# Patient Record
Sex: FEMALE | Race: WHITE | NOT HISPANIC OR LATINO | Employment: UNEMPLOYED | URBAN - METROPOLITAN AREA
[De-identification: names, ages, dates, MRNs, and addresses within clinical notes are randomized per-mention and may not be internally consistent; named-entity substitution may affect disease eponyms.]

---

## 2023-01-24 ENCOUNTER — TRANSCRIBE ORDERS (OUTPATIENT)
Dept: ADMINISTRATIVE | Facility: HOSPITAL | Age: 48
End: 2023-01-24
Payer: COMMERCIAL

## 2023-01-24 DIAGNOSIS — Z12.31 SCREENING MAMMOGRAM FOR BREAST CANCER: Primary | ICD-10-CM

## 2023-02-21 ENCOUNTER — APPOINTMENT (OUTPATIENT)
Dept: OTHER | Facility: HOSPITAL | Age: 48
End: 2023-02-21
Payer: COMMERCIAL

## 2023-02-21 ENCOUNTER — HOSPITAL ENCOUNTER (OUTPATIENT)
Dept: MAMMOGRAPHY | Facility: HOSPITAL | Age: 48
Discharge: HOME | End: 2023-02-21
Payer: COMMERCIAL

## 2023-02-21 DIAGNOSIS — Z92.89 HISTORY OF MAMMOGRAM: ICD-10-CM

## 2023-02-21 DIAGNOSIS — Z12.31 SCREENING MAMMOGRAM FOR BREAST CANCER: ICD-10-CM

## 2023-02-21 PROCEDURE — 77063 BREAST TOMOSYNTHESIS BI: CPT | Performed by: RADIOLOGY

## 2023-02-21 PROCEDURE — 77067 SCR MAMMO BI INCL CAD: CPT | Performed by: RADIOLOGY

## 2023-02-21 PROCEDURE — 77067 SCR MAMMO BI INCL CAD: CPT

## 2023-02-21 PROCEDURE — 77063 BREAST TOMOSYNTHESIS BI: CPT

## 2023-04-24 ENCOUNTER — HOSPITAL ENCOUNTER (OUTPATIENT)
Dept: ULTRASOUND IMAGING | Facility: HOSPITAL | Age: 48
Discharge: HOME | End: 2023-04-24
Payer: COMMERCIAL

## 2023-04-24 ENCOUNTER — HOSPITAL ENCOUNTER (OUTPATIENT)
Dept: MAMMOGRAPHY | Facility: HOSPITAL | Age: 48
Discharge: HOME | End: 2023-04-24
Payer: COMMERCIAL

## 2023-04-24 DIAGNOSIS — R92.8 ABNORMAL MAMMOGRAM: ICD-10-CM

## 2023-04-24 PROCEDURE — 76642 ULTRASOUND BREAST LIMITED: CPT

## 2023-04-24 PROCEDURE — 77066 DX MAMMO INCL CAD BI: CPT

## 2023-04-24 PROCEDURE — 77062 BREAST TOMOSYNTHESIS BI: CPT | Performed by: RADIOLOGY

## 2023-04-24 PROCEDURE — 76642 ULTRASOUND BREAST LIMITED: CPT | Performed by: RADIOLOGY

## 2023-04-24 PROCEDURE — G0279 TOMOSYNTHESIS, MAMMO: HCPCS

## 2023-04-24 PROCEDURE — 77066 DX MAMMO INCL CAD BI: CPT | Performed by: RADIOLOGY

## 2023-05-22 ENCOUNTER — HOSPITAL ENCOUNTER (OUTPATIENT)
Dept: MAMMOGRAPHY | Facility: HOSPITAL | Age: 48
Discharge: HOME | End: 2023-05-22
Payer: COMMERCIAL

## 2023-05-22 DIAGNOSIS — R92.8 ABNORMAL MAMMOGRAM: ICD-10-CM

## 2023-05-22 PROCEDURE — A4648 IMPLANTABLE TISSUE MARKER: HCPCS

## 2023-05-22 PROCEDURE — 88305 TISSUE EXAM BY PATHOLOGIST: CPT | Performed by: RADIOLOGY

## 2023-05-22 PROCEDURE — 0 LIDOCAINE 1 % SOLUTION: Performed by: RADIOLOGY

## 2023-05-22 PROCEDURE — 76098 X-RAY EXAM SURGICAL SPECIMEN: CPT

## 2023-05-22 RX ORDER — LIDOCAINE HYDROCHLORIDE 10 MG/ML
5 INJECTION, SOLUTION INFILTRATION; PERINEURAL ONCE
Status: COMPLETED | OUTPATIENT
Start: 2023-05-22 | End: 2023-05-22

## 2023-05-22 RX ORDER — LIDOCAINE HYDROCHLORIDE AND EPINEPHRINE 10; 10 MG/ML; UG/ML
10 INJECTION, SOLUTION INFILTRATION; PERINEURAL ONCE
Status: COMPLETED | OUTPATIENT
Start: 2023-05-22 | End: 2023-05-22

## 2023-05-22 RX ADMIN — LIDOCAINE HYDROCHLORIDE,EPINEPHRINE BITARTRATE 10 ML: 10; .01 INJECTION, SOLUTION INFILTRATION; PERINEURAL at 14:48

## 2023-05-22 RX ADMIN — Medication 5 ML: at 14:39

## 2023-05-22 RX ADMIN — LIDOCAINE HYDROCHLORIDE,EPINEPHRINE BITARTRATE 10 ML: 10; .01 INJECTION, SOLUTION INFILTRATION; PERINEURAL at 14:39

## 2023-05-22 NOTE — PROGRESS NOTES
Alert and orientated. Denies discomfort, no active bleeding, steri-strips not visualized, gauze dressing intact.  Cold packs given. Verbalizes and demonstrates understanding of post-care instructions, written copy given.

## 2023-05-25 ENCOUNTER — HOSPITAL ENCOUNTER (OUTPATIENT)
Dept: MAMMOGRAPHY | Facility: HOSPITAL | Age: 48
Discharge: HOME | End: 2023-05-25
Payer: COMMERCIAL

## 2023-05-25 ENCOUNTER — TELEPHONE (OUTPATIENT)
Dept: MAMMOGRAPHY | Facility: HOSPITAL | Age: 48
End: 2023-05-25
Payer: COMMERCIAL

## 2023-05-25 DIAGNOSIS — R92.8 ABNORMAL RADIOGRAPHIC FINDINGS IN SPECIMEN FROM FEMALE BREAST: ICD-10-CM

## 2023-05-25 LAB
CYTO UR: NORMAL
LAB AP CASE REPORT: NORMAL
LAB AP CLINICAL INFORMATION: NORMAL
LAB AP DIAGNOSIS COMMENT: NORMAL
PATH REPORT.FINAL DX SPEC: NORMAL
PATH REPORT.GROSS SPEC: NORMAL

## 2023-05-25 PROCEDURE — 76098 X-RAY EXAM SURGICAL SPECIMEN: CPT

## 2023-05-25 NOTE — TELEPHONE ENCOUNTER
Attempted to notify patient of pathology results and recommendation.  A message was left on her voicemail to return my call.

## 2023-05-26 ENCOUNTER — TELEPHONE (OUTPATIENT)
Dept: MAMMOGRAPHY | Facility: HOSPITAL | Age: 48
End: 2023-05-26
Payer: COMMERCIAL

## 2023-12-04 ENCOUNTER — TRANSCRIBE ORDERS (OUTPATIENT)
Dept: ADMINISTRATIVE | Facility: HOSPITAL | Age: 48
End: 2023-12-04
Payer: COMMERCIAL

## 2023-12-04 DIAGNOSIS — R92.8 ABNORMAL MAMMOGRAM: Primary | ICD-10-CM

## 2024-02-13 ENCOUNTER — TRANSCRIBE ORDERS (OUTPATIENT)
Dept: ADMINISTRATIVE | Facility: HOSPITAL | Age: 49
End: 2024-02-13
Payer: COMMERCIAL

## 2024-02-13 DIAGNOSIS — K21.9 GASTROESOPHAGEAL REFLUX DISEASE WITHOUT ESOPHAGITIS: Primary | ICD-10-CM

## 2024-04-18 ENCOUNTER — HOSPITAL ENCOUNTER (OUTPATIENT)
Dept: NUCLEAR MEDICINE | Facility: HOSPITAL | Age: 49
Discharge: HOME | End: 2024-04-18
Payer: COMMERCIAL

## 2024-04-18 DIAGNOSIS — K21.9 GASTROESOPHAGEAL REFLUX DISEASE WITHOUT ESOPHAGITIS: ICD-10-CM

## 2024-04-18 PROCEDURE — 78264 GASTRIC EMPTYING IMG STUDY: CPT

## 2024-04-18 PROCEDURE — A9541 TC99M SULFUR COLLOID: HCPCS | Performed by: INTERNAL MEDICINE

## 2024-04-18 PROCEDURE — 0 TECHNETIUM SULFUR COLLOID: Performed by: INTERNAL MEDICINE

## 2024-04-18 RX ADMIN — TECHNETIUM TC 99M SULFUR COLLOID 1 DOSE: KIT at 09:19

## 2024-04-25 ENCOUNTER — HOSPITAL ENCOUNTER (OUTPATIENT)
Dept: MAMMOGRAPHY | Facility: HOSPITAL | Age: 49
Discharge: HOME | End: 2024-04-25
Admitting: INTERNAL MEDICINE
Payer: COMMERCIAL

## 2024-04-25 DIAGNOSIS — R92.8 ABNORMAL MAMMOGRAM: ICD-10-CM

## 2024-04-25 PROCEDURE — 77066 DX MAMMO INCL CAD BI: CPT

## 2024-04-25 PROCEDURE — G0279 TOMOSYNTHESIS, MAMMO: HCPCS

## 2024-05-01 ENCOUNTER — OFFICE VISIT (OUTPATIENT)
Dept: GASTROENTEROLOGY | Facility: CLINIC | Age: 49
End: 2024-05-01
Payer: COMMERCIAL

## 2024-05-01 VITALS
OXYGEN SATURATION: 99 % | DIASTOLIC BLOOD PRESSURE: 82 MMHG | BODY MASS INDEX: 26.13 KG/M2 | HEART RATE: 71 BPM | SYSTOLIC BLOOD PRESSURE: 118 MMHG | WEIGHT: 138.4 LBS | HEIGHT: 61 IN

## 2024-05-01 DIAGNOSIS — R10.13 DYSPEPSIA: Primary | ICD-10-CM

## 2024-05-01 DIAGNOSIS — K29.70 GASTRITIS WITHOUT BLEEDING, UNSPECIFIED CHRONICITY, UNSPECIFIED GASTRITIS TYPE: ICD-10-CM

## 2024-05-01 DIAGNOSIS — K52.9 ILEITIS: ICD-10-CM

## 2024-05-01 PROCEDURE — 99204 OFFICE O/P NEW MOD 45 MIN: CPT | Performed by: INTERNAL MEDICINE

## 2024-05-01 RX ORDER — PANTOPRAZOLE SODIUM 40 MG/1
40 TABLET, DELAYED RELEASE ORAL 2 TIMES DAILY
COMMUNITY
Start: 2024-02-15

## 2024-05-01 RX ORDER — MULTIVIT-MIN/IRON/FOLIC ACID/K 18-600-40
CAPSULE ORAL
COMMUNITY
Start: 2024-02-15

## 2024-05-01 NOTE — PROGRESS NOTES
"GASTROENTEROLOGY OFFICE NOTE  Cydney Pimentel  [id number]  1975      Chief Complaint   Patient presents with    Gastroesophageal reflux disease without esophagitis     cough        HISTORY OF PRESENT ILLNESS:  First visit to me for this 48-year-old female    Taking care of her parents and other family members.  She has had a recent evaluation by Dr. Felipe Tomlinson.  She underwent a screening colonoscopy which was within normal limits except for some nonspecific ileitis which is not felt to be necessarily diagnostic for Crohn's disease.  She has no particular problems with right lower quadrant abdominal pain or diarrhea or cramping.  Budesonide was recommended.  Serpiginous ulcerations were noted with some edema and nonobstructive stenosis in the terminal ileum.  A Tame IBD panel returned \"not consistent with IBD \".  She has also had an upper endoscopy which revealed nonspecific gastritis.  She is taking pantoprazole 40 mg p.o. twice daily and reflux symptoms seem to improve.    She has had GERD for years.  It has been increasing over the past year.  She has a nighttime cough.  She had a gastric emptying scan which was reportedly within normal limits.    She states she can regurgitate food sometimes ingested the night before.  She denies dysphagia to solids or odynophagia..  She has no true early satiety but symptoms seem to be wore with large-volume meals or fatty meals.    PAST MEDICAL HISTORY  Past Medical History:    Anemia    GERD (gastroesophageal reflux disease)        PAST SURGICAL HISTORY  Past Surgical History:    COLONOSCOPY    UPPER GASTROINTESTINAL ENDOSCOPY        MEDICATIONS:    Current Outpatient Medications:     cetirizine (zyrTEC) 10 MG tablet, Take 1 tablet by mouth Daily., Disp: , Rfl:     Cholecalciferol (Vitamin D) 50 MCG (2000 UT) capsule, , Disp: , Rfl:     pantoprazole (PROTONIX) 40 MG EC tablet, Take 1 tablet by mouth 2 (Two) Times a Day., Disp: , Rfl:     ALLERGIES  has No Known " "Allergies.    FAMILY HISTORY:  Cancer-related family history includes Ovarian cancer in her maternal aunt. There is no history of Breast cancer.  Colon Cancer-related family history is not on file.    SOCIAL HISTORY  She  reports that she has never smoked. She has never used smokeless tobacco. She reports that she does not drink alcohol and does not use drugs.   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  Her daughters are 12 and 14 years old.  She is a non-smoker.  Nondrinker.    PHYSICAL EXAM   /82 (BP Location: Left arm, Patient Position: Sitting, Cuff Size: Adult)   Pulse 71   Ht 154.9 cm (61\")   Wt 62.8 kg (138 lb 6.4 oz)   LMP 04/25/2024   SpO2 99%   BMI 26.15 kg/m²   General: Alert and oriented x 3. In no apparent or acute distress.  and No stigmata of chronic liver disease  HEENT: Anicteric sclerae. Normal oropharynx  Neck: Supple. Without lymphadenopathy  CV: Regular rate and rhythm, S1, S2  Lungs: Clear to ausculation. Without rales, rhonchi and wheezing  Abdomen:  Soft,non-distended without palpable masses or hepatosplenomeagaly, areas of rebound tenderness or guarding.   Extremeties: without clubbing, cyanosis or edema  Neurologic:  Alert and oriented x 3 without focal motor or sensory deficits  Rectal exam: deferred       ASSESSMENT  1.-Chronic dyspeptic symptoms highly suggestive of gastroparesis.  She is a reliable historian and the fact that she can regurgitate food sometimes ingested clearly well over 4 to 6 hours before is consistent with gastroparesis.  Gastric emptying scan is are notoriously unreliable  That her history is consistent with gastroparesis.  She is agreeable to a trial of metoclopramide.  We talked about nasal metoclopramide which may be a little bit more difficult to get her insurance covered but she is fine with taking just oral metoclopramide initially and adhering to dietary modification for gastroparesis  2.-History of nonspecific " gastritis.  No evidence of H. pylori  3.-History of ileitis without symptoms or signs suggestive of Crohn's disease.  She may indeed have mild Crohn's ileitis and this may warrant repeat evaluation pending her clinical progress.  Of note, biopsies were consistent although not clearly diagnostic for Crohn's disease.    PLAN  1.-Initiate metoclopramide 10 mg p.o. 3 times daily half an hour before meals and titrate downward to the lowest dose necessary for improvement of symptoms.  If responsive but requiring ongoing therapy consider domperidone to minimize risk of permanent tardive dyskinesia which was reviewed (currently stated to be approximately 1 for every 1000 patient years)  2.-Consider repeat colonoscopy for reassessment of possible Crohn's ileitis pending clinical progress  3.-Dietary modification gastroparesis recommended  3.-Follow-up appointment in 3 months.      Jacky Navarro MD  5/1/2024   14:33 EDT

## 2024-05-05 PROBLEM — R10.13 DYSPEPSIA: Status: ACTIVE | Noted: 2024-05-05

## 2024-05-05 PROBLEM — K29.70 GASTRITIS WITHOUT BLEEDING: Status: ACTIVE | Noted: 2024-05-05

## 2024-05-05 PROBLEM — K52.9 ILEITIS: Status: ACTIVE | Noted: 2024-05-05

## 2024-05-08 ENCOUNTER — TELEPHONE (OUTPATIENT)
Dept: GASTROENTEROLOGY | Facility: CLINIC | Age: 49
End: 2024-05-08
Payer: COMMERCIAL

## 2024-05-08 RX ORDER — METOCLOPRAMIDE 10 MG/1
10 TABLET ORAL
Qty: 90 TABLET | Refills: 3 | Status: CANCELLED | OUTPATIENT
Start: 2024-05-08

## 2024-05-08 RX ORDER — METOCLOPRAMIDE 10 MG/1
10 TABLET ORAL
Qty: 90 TABLET | Refills: 3 | Status: SHIPPED | OUTPATIENT
Start: 2024-05-08

## 2024-08-06 ENCOUNTER — OFFICE VISIT (OUTPATIENT)
Dept: GASTROENTEROLOGY | Facility: CLINIC | Age: 49
End: 2024-08-06
Payer: COMMERCIAL

## 2024-08-06 VITALS
DIASTOLIC BLOOD PRESSURE: 88 MMHG | SYSTOLIC BLOOD PRESSURE: 131 MMHG | HEART RATE: 71 BPM | BODY MASS INDEX: 26.06 KG/M2 | HEIGHT: 61 IN | WEIGHT: 138 LBS

## 2024-08-06 DIAGNOSIS — K52.9 ILEITIS: ICD-10-CM

## 2024-08-06 DIAGNOSIS — R10.13 DYSPEPSIA: Primary | ICD-10-CM

## 2024-08-06 DIAGNOSIS — K21.9 GASTROESOPHAGEAL REFLUX DISEASE WITHOUT ESOPHAGITIS: ICD-10-CM

## 2024-08-06 DIAGNOSIS — K31.84 GASTROPARESIS: ICD-10-CM

## 2024-08-06 PROCEDURE — 99214 OFFICE O/P EST MOD 30 MIN: CPT | Performed by: INTERNAL MEDICINE

## 2024-08-06 RX ORDER — PANTOPRAZOLE SODIUM 40 MG/1
40 TABLET, DELAYED RELEASE ORAL 2 TIMES DAILY
Qty: 180 TABLET | Refills: 3 | Status: SHIPPED | OUTPATIENT
Start: 2024-08-06

## 2024-08-06 NOTE — PROGRESS NOTES
GASTROENTEROLOGY OFFICE NOTE  Cydney Pimentel  [id number]  1975      Chief Complaint   Patient presents with    Dyspepsia    Gastroparesis        HISTORY OF PRESENT ILLNESS:  Patient presents for follow-up.  I saw her on May 1, 2024 when she presented with gastroesophageal reflux disease and cough.  She has had a previous workup by Dr. Felipe Tomlinson, GI,.  The colonoscopy was within normal limits except for nonspecific ileitis which was not felt to be diagnostic for Crohn's disease.  Budesonide was recommended.  Serpiginous ulcerations were noted and a nonobstructive stenotic area was also identified.  Upper endoscopy revealed gastritis.  Pantoprazole 40 mg twice daily have resolved reflux type symptoms.    Her history was suggestive of gastroparesis.  She gave a history of sometimes regurgitating food ingested the night before.  Again, she is without dysphagia to solids, odynophagia, early satiety or unexplained weight loss.    It was felt that she had gastroparesis and metoclopramide as recommended but she did not initiated.  She instituted dietary modification gastroparesis and states that she is doing better.  When she does get some exacerbation of her dyspeptic symptoms walking or drinking warm water, a very small amount, seems to help her.    PAST MEDICAL HISTORY  Past Medical History:    Anemia    GERD (gastroesophageal reflux disease)        PAST SURGICAL HISTORY  Past Surgical History:    COLONOSCOPY    UPPER GASTROINTESTINAL ENDOSCOPY        MEDICATIONS:    Current Outpatient Medications:     cetirizine (zyrTEC) 10 MG tablet, Take 1 tablet by mouth Daily., Disp: , Rfl:     Cholecalciferol (Vitamin D) 50 MCG (2000 UT) capsule, , Disp: , Rfl:     metoclopramide (REGLAN) 10 MG tablet, Take 1 tablet by mouth 3 (Three) Times a Day Before Meals., Disp: 90 tablet, Rfl: 3    pantoprazole (PROTONIX) 40 MG EC tablet, Take 1 tablet by mouth 2 (Two) Times a Day., Disp: 180 tablet, Rfl: 3    ALLERGIES  has No Known  "Allergies.    FAMILY HISTORY:  Cancer-related family history includes Ovarian cancer in her maternal aunt. There is no history of Breast cancer.  Colon Cancer-related family history is not on file.    SOCIAL HISTORY  She  reports that she has never smoked. She has never used smokeless tobacco. She reports that she does not drink alcohol and does not use drugs.   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  She has daughters ages 12 and 14.  She is a non-smoker.  Nondrinker.      PHYSICAL EXAM   /88 (BP Location: Right arm, Patient Position: Sitting, Cuff Size: Large Adult)   Pulse 71   Ht 154.9 cm (61\")   Wt 62.6 kg (138 lb)   BMI 26.07 kg/m²   General: Alert and oriented x 3. In no apparent or acute distress.  and No stigmata of chronic liver disease  HEENT: Anicteric sclerae. Normal oropharynx  Neck: Supple. Without lymphadenopathy  CV: Regular rate and rhythm, S1, S2  Lungs: Clear to ausculation. Without rales, rhonchi and wheezing  Abdomen:  Soft,non-distended without palpable masses or hepatosplenomeagaly, areas of rebound tenderness or guarding.   Extremeties: without clubbing, cyanosis or edema  Neurologic:  Alert and oriented x 3 without focal motor or sensory deficits  Rectal exam: deferred       ASSESSMENT  1.-Gastroparesis improved with dietary modifications.  Occasional exacerbation of symptoms.  She is provided with a sample of intranasal metoclopramide to use as rescue therapy on those occasions where her symptoms exacerbate  2.-Gastroesophageal reflux disease.  Continue pantoprazole 40 mg p.o. twice daily  3.-Stenotic ileum with serpiginous ulceration suggestive of Crohn's disease..  Asymptomatic without right lower quadrant abdominal pain, diarrhea.  Is being managed conservatively at this time but initiation of therapy and repeated colonoscopy are considerations    PLAN  1.-Continue dietary modification gastroparesis  2.-Consider repeat " colonoscopy  3.-Intranasal metoclopramide to be used as needed  4.-Return appointment      Jacky Navarro MD  8/9/2024   06:34 EDT

## 2024-08-09 PROBLEM — K21.9 GASTROESOPHAGEAL REFLUX DISEASE WITHOUT ESOPHAGITIS: Status: ACTIVE | Noted: 2024-08-09

## 2024-08-09 PROBLEM — K31.84 GASTROPARESIS: Status: ACTIVE | Noted: 2024-08-09

## 2025-01-17 ENCOUNTER — TRANSCRIBE ORDERS (OUTPATIENT)
Dept: ADMINISTRATIVE | Facility: HOSPITAL | Age: 50
End: 2025-01-17
Payer: COMMERCIAL

## 2025-01-17 DIAGNOSIS — T17.998A ASPIRATION OF LIQUID, INITIAL ENCOUNTER: Primary | ICD-10-CM

## 2025-01-24 ENCOUNTER — TRANSCRIBE ORDERS (OUTPATIENT)
Dept: ADMINISTRATIVE | Facility: HOSPITAL | Age: 50
End: 2025-01-24
Payer: COMMERCIAL

## 2025-01-24 ENCOUNTER — HOSPITAL ENCOUNTER (OUTPATIENT)
Dept: GENERAL RADIOLOGY | Facility: HOSPITAL | Age: 50
Discharge: HOME | End: 2025-01-24
Payer: COMMERCIAL

## 2025-01-24 DIAGNOSIS — T17.998A ASPIRATION OF LIQUID, INITIAL ENCOUNTER: Primary | ICD-10-CM

## 2025-01-24 DIAGNOSIS — T17.998A ASPIRATION OF LIQUID, INITIAL ENCOUNTER: ICD-10-CM

## 2025-01-24 PROCEDURE — 92611 MOTION FLUOROSCOPY/SWALLOW: CPT

## 2025-01-24 PROCEDURE — 74230 X-RAY XM SWLNG FUNCJ C+: CPT

## 2025-01-24 RX ADMIN — BARIUM SULFATE 100 ML: 0.81 POWDER, FOR SUSPENSION ORAL at 09:00

## 2025-01-24 RX ADMIN — BARIUM SULFATE 20 ML: 400 PASTE ORAL at 09:00

## 2025-01-24 NOTE — MBS/VFSS/FEES
Outpatient Speech Language Pathology   Adult Swallow Initial Evaluation  Modified Barium Swallow Study (MBS)   [facility]     Patient Name: Cydney Pimentel  : 1975  MRN: [id number]  Today's Date: 2025         Visit Date: 2025   Patient Active Problem List   Diagnosis    Gastritis without bleeding    Dyspepsia    Ileitis    Gastroparesis    Gastroesophageal reflux disease without esophagitis        Past Medical History:   Diagnosis Date    Anemia     GERD (gastroesophageal reflux disease)         Past Surgical History:   Procedure Laterality Date    COLONOSCOPY      UPPER GASTROINTESTINAL ENDOSCOPY           Visit Dx:     ICD-10-CM ICD-9-CM   1. Aspiration of liquid, initial encounter  T17.998A 934.9                SLP Adult Swallow Evaluation       Row Name 25 0830       General Information    Pertinent History Of Current Problem Chronic cough. GERD, gastroparesis, takes PPI BID. Feels aspiration intermittently through the day, including on saliva when not eating/drinking.  -SM    Current Method of Nutrition regular textures;thin liquids  -    Plans/Goals Discussed with patient;agreed upon  -    Barriers to Rehab none identified  -    Patient's Goals for Discharge eat/drink without coughing/choking  -SM       Pain    Pretreatment Pain Rating 0/10 - no pain  -SM    Posttreatment Pain Rating 0/10 - no pain  -SM       MBS/VFSS    Utensils Used spoon;cup;straw  -SM    Consistencies Trialed thin liquids;pureed;regular textures  -SM       MBS/VFSS Interpretation    Oral Phase WFL  -SM    Pharyngeal Phase functional pharyngeal phase of swallowing  -SM    Esophageal Phase other (see comments)  -SM    Esophageal Phase, Comment All barium media observed to empty into stomach without issue. Though esophageal scan in upright position is not sensitive for capturing ARMANDO. Based on symptoms and pattern of occurrence, suspect cough to be GERD-related (vs other potential irritant).  -       SLP  Evaluation Clinical Impression    SLP Swallowing Diagnosis functional oral phase;functional pharyngeal phase  -    Swallow Criteria for Skilled Therapeutic Interventions Met no problems identified which require skilled intervention  -       Recommendations    Therapy Frequency (Swallow) evaluation only  -    SLP Diet Recommendation regular textures;thin liquids  -    Recommended Diagnostics No further SLP services recommended  -    Recommended Precautions and Strategies reflux precautions  -    SLP Rec. for Method of Medication Administration as tolerated  Pt reports no difficulties taking pills. Discussed trialing whole in puree if experiences any discomfort or sticking with swallow.  -              User Key  (r) = Recorded By, (t) = Taken By, (c) = Cosigned By      Initials Name Provider Type    Keyona Hodge MS CCC-SLP Speech and Language Pathologist                               Time Calculation:   SLP Start Time: 0830  Untimed Charges  69573-OR Motion Fluoro Eval Swallow Minutes: 60  Total Minutes  Untimed Charges Total Minutes: 60   Total Minutes: 60    Therapy Charges for Today       Code Description Service Date Service Provider Modifiers Qty    16884051027 HC ST MOTION FLUORO EVAL SWALLOW 4 1/24/2025 Keyona Borden MS CCC-SLP GN 1                     Keyona Borden MS CCC-SLP  1/24/2025

## 2025-01-29 ENCOUNTER — TRANSCRIBE ORDERS (OUTPATIENT)
Dept: ADMINISTRATIVE | Facility: HOSPITAL | Age: 50
End: 2025-01-29
Payer: COMMERCIAL

## 2025-01-29 DIAGNOSIS — Z12.31 VISIT FOR SCREENING MAMMOGRAM: Primary | ICD-10-CM

## 2025-02-05 ENCOUNTER — HOSPITAL ENCOUNTER (OUTPATIENT)
Facility: HOSPITAL | Age: 50
Discharge: HOME | End: 2025-02-05
Admitting: INTERNAL MEDICINE
Payer: COMMERCIAL

## 2025-02-05 DIAGNOSIS — Z12.31 VISIT FOR SCREENING MAMMOGRAM: ICD-10-CM

## 2025-02-05 LAB
NCCN CRITERIA FLAG: NORMAL
TYRER CUZICK SCORE: 10.9

## 2025-02-05 PROCEDURE — 77063 BREAST TOMOSYNTHESIS BI: CPT

## 2025-02-05 PROCEDURE — 77067 SCR MAMMO BI INCL CAD: CPT

## 2025-02-11 ENCOUNTER — OFFICE VISIT (OUTPATIENT)
Dept: GASTROENTEROLOGY | Facility: CLINIC | Age: 50
End: 2025-02-11
Payer: COMMERCIAL

## 2025-02-11 VITALS
SYSTOLIC BLOOD PRESSURE: 108 MMHG | WEIGHT: 128 LBS | HEART RATE: 74 BPM | DIASTOLIC BLOOD PRESSURE: 80 MMHG | HEIGHT: 61 IN | BODY MASS INDEX: 24.17 KG/M2 | OXYGEN SATURATION: 99 %

## 2025-02-11 DIAGNOSIS — K21.9 GASTROESOPHAGEAL REFLUX DISEASE WITHOUT ESOPHAGITIS: ICD-10-CM

## 2025-02-11 DIAGNOSIS — R10.13 DYSPEPSIA: Primary | ICD-10-CM

## 2025-02-11 DIAGNOSIS — K31.84 GASTROPARESIS: ICD-10-CM

## 2025-02-11 DIAGNOSIS — R43.2 LOSS OF TASTE: Primary | ICD-10-CM

## 2025-02-11 DIAGNOSIS — R43.2 AGEUSIA: ICD-10-CM

## 2025-02-11 DIAGNOSIS — K52.9 ILEITIS: ICD-10-CM

## 2025-02-11 PROCEDURE — 99214 OFFICE O/P EST MOD 30 MIN: CPT | Performed by: INTERNAL MEDICINE

## 2025-02-11 RX ORDER — FERROUS GLUCONATE 324(38)MG
324 TABLET ORAL 2 TIMES DAILY
COMMUNITY

## 2025-02-15 PROBLEM — R43.2 AGEUSIA: Status: ACTIVE | Noted: 2025-02-15

## 2025-02-15 NOTE — PROGRESS NOTES
GASTROENTEROLOGY OFFICE NOTE  Cydney Pimentel  [id number]  1975      Chief Complaint   Patient presents with    Gastroesophageal Reflux Disease, Gastroparesis        HISTORY OF PRESENT ILLNESS:  History of Present Illness  The patient is a 49-year-old female who presents for a 6-month follow-up for dyspepsia and GERD.    She has been experiencing ageusia since October 2024, with a complete loss of taste, including the inability to detect salt. She reports no issues with her sense of smell. She has not consulted a neurologist but did see her primary care physician in January 2025, who suggested potential nutritional deficiencies. Her B6 and B12 levels were normal, but she has iron deficiency anemia, for which she is receiving treatment. She also reports fissures on her tongue, constant numbness, and increased sensitivity in her gums. She has an upcoming appointment with an ENT specialist.    She has lost approximately 15 pounds over the past year due to decreased food intake, as she no longer enjoys eating. She reports no history of respiratory infections or medication changes, except for long-term use of Protonix. Her daughter had a respiratory tract infection, but COVID-19 tests at home were negative. She maintains a healthy diet and reports no abdominal pain, difficulty swallowing, vomiting, blood in stools, or changes in appetite or weight. She also reports no right lower quadrant abdominal pain, cramps, fevers, or chills. She describes a tingling sensation in her tongue, similar to neuropathy.    She has a history of gastroparesis for which p.r.n. use of GIMOTTI was recommended, but she currently is not using it. She has not been using GIMOTTI due to fear of tardive dyskinesia and has experienced mood changes postpartum. She is taking Protonix 40 mg twice daily, which she reports as effective in managing her reflux symptoms.    She has a history of iron deficiency anemia.    MEDICATIONS  Protonix    PAST  "MEDICAL HISTORY  Past Medical History:    Anemia    GERD (gastroesophageal reflux disease)        PAST SURGICAL HISTORY  Past Surgical History:    COLONOSCOPY    UPPER GASTROINTESTINAL ENDOSCOPY        MEDICATIONS:    Current Outpatient Medications:     cetirizine (zyrTEC) 10 MG tablet, Take 1 tablet by mouth Daily., Disp: , Rfl:     Cholecalciferol (Vitamin D) 50 MCG (2000 UT) capsule, , Disp: , Rfl:     ferrous gluconate (FERGON) 324 MG tablet, Take 1 tablet by mouth 2 (Two) Times a Day., Disp: , Rfl:     pantoprazole (PROTONIX) 40 MG EC tablet, Take 1 tablet by mouth 2 (Two) Times a Day., Disp: 180 tablet, Rfl: 3    amitriptyline (ELAVIL) 25 MG tablet, Take 2 tablets by mouth Every Night. Start with one half tablet and increase by one half tablet nightly as tolerated up to 2 tablets at bedtime, Disp: 60 tablet, Rfl: 11    ALLERGIES  has No Known Allergies.    FAMILY HISTORY:  Cancer-related family history includes Ovarian cancer in her maternal aunt. There is no history of Breast cancer.  Colon Cancer-related family history is not on file.    SOCIAL HISTORY  She  reports that she has never smoked. She has never used smokeless tobacco. She reports that she does not drink alcohol and does not use drugs.   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  She has daughters ages 12 and 14.  She is a non-smoker.  Nondrinker      PHYSICAL EXAM   /80 (BP Location: Right arm, Patient Position: Sitting, Cuff Size: Large Adult)   Pulse 74   Ht 154.9 cm (61\")   Wt 58.1 kg (128 lb)   SpO2 99%   BMI 24.19 kg/m²   General: Pleasant, no apparent acute distress.  Alert and oriented.  HEENT: Anicteric sclera.  Significant fissuring of the tongue was not noted  Lungs: Grossly normal respiration without labored breathing or audible wheezing noted.  Speaking in full sentences  Abdomen: Without gross or obvious distention  Neurologic: Normal cognition and affect.  Alert and " oriented        ASSESSMENT/PLAN  Assessment & Plan  1. Ageusia.  The condition may be indicative of neuropathy. She reports a loss of taste since October 2020, with the inability to taste salt and hypersensitivity to spices. A referral to an ENT specialist, Dr. Liu, will be made for further evaluation. A prescription for Elavil 25 mg has been provided, with instructions to start at half a tablet and gradually increase to 50 mg as tolerated. She is advised to persist with this regimen for a period of 6 to 8 weeks. If she experiences grogginess in the morning, the dose should be adjusted accordingly.  Following up with ENT for further evaluation was reviewed as well as consideration of CT brain for further evaluation.    2. Dyspepsia and GERD.  She is currently taking Protonix 40 mg twice daily, which has been effective in managing her symptoms. She is advised to continue this medication as prescribed.    3. Gastroparesis.  She has a history of gastroparesis and was recommended p.r.n. use of GIMOTTI, which she is currently not using due to fear of side effects. She is encouraged to use GIMOTTI for rescue therapy on days when she feels particularly bloated or full.    4. Iron Deficiency Anemia.  She reports iron deficiency and is currently taking the appropriate supplements. She is advised to continue her current iron supplementation regimen.    5. Suspected Crohn's Disease.  There is a suspicion of mild focal Crohn's disease based on her 2023 colonoscopy results showing serpiginous ulceration and edema with nonobstructive stenosis of the terminal ileum. A colonoscopy will be scheduled to reassess the stenotic ulcerated terminal ileum. If active disease is confirmed, treatment with mesalamine will be initiated.      Jacky Navarro MD  2/15/2025   13:46 EST      Patient or patient representative verbalized consent for the use of Ambient Listening during the visit with  Jacky Navarro MD  for chart documentation. 2/15/2025  13:48 EST

## 2025-03-04 RX ORDER — SODIUM, POTASSIUM,MAG SULFATES 17.5-3.13G
SOLUTION, RECONSTITUTED, ORAL ORAL
Qty: 354 ML | Refills: 0 | Status: SHIPPED | OUTPATIENT
Start: 2025-03-04

## 2025-03-10 ENCOUNTER — OUTSIDE FACILITY SERVICE (OUTPATIENT)
Dept: GASTROENTEROLOGY | Facility: CLINIC | Age: 50
End: 2025-03-10
Payer: COMMERCIAL

## 2025-03-10 DIAGNOSIS — K50.018 CROHN'S DISEASE OF SMALL INTESTINE WITH OTHER COMPLICATION: Primary | ICD-10-CM

## 2025-03-11 ENCOUNTER — TRANSCRIBE ORDERS (OUTPATIENT)
Dept: ADMINISTRATIVE | Facility: HOSPITAL | Age: 50
End: 2025-03-11
Payer: COMMERCIAL

## 2025-03-11 DIAGNOSIS — R43.2 PARAGEUSIA: ICD-10-CM

## 2025-03-11 DIAGNOSIS — R43.2 TASTE SENSE ALTERED: Primary | ICD-10-CM

## 2025-03-14 ENCOUNTER — RESULTS FOLLOW-UP (OUTPATIENT)
Dept: GASTROENTEROLOGY | Facility: CLINIC | Age: 50
End: 2025-03-14
Payer: COMMERCIAL

## 2025-03-14 NOTE — LETTER
March 17, 2025    Cydney Pimentel  [address] [address]          Dear Ms. Pimentel:  This letter is to review the biopsy report of your March 10, 2025 colonoscopy.    As you know we found changes consistent with fiber stenosing Crohn's ileitis.    Biopsies did show inflammatory changes consistent with Crohn's disease.  I should note that these inflammatory edges were nonspecific and are not necessarily diagnostic of Crohn's disease however the gross endoscopic appearance and the finding of the stricture are consistent with this diagnosis.    I think if we do not treat these findings there is a risk for progressive disease and further stricturing and ultimately obstruction of your small bowel.    We have ordered a CT enterography/CAT scan enterography to look at the small bowel in further detail and subsequent to that we will make further recommendations on how to best approach managing this very focal Crohn's disease.    I hope this letter finds you well.  I encourage you to call with any questions or concerns you may have.    Sincerely,  Aries Navarro MD    CC: Dr. Roxana Flores

## 2025-03-17 ENCOUNTER — TRANSCRIBE ORDERS (OUTPATIENT)
Dept: NUTRITION | Facility: HOSPITAL | Age: 50
End: 2025-03-17
Payer: COMMERCIAL

## 2025-03-17 DIAGNOSIS — K50.00 CROHN'S DISEASE OF SMALL INTESTINE WITHOUT COMPLICATION: Primary | ICD-10-CM

## 2025-03-18 ENCOUNTER — TRANSCRIBE ORDERS (OUTPATIENT)
Dept: ADMINISTRATIVE | Facility: HOSPITAL | Age: 50
End: 2025-03-18
Payer: COMMERCIAL

## 2025-03-18 ENCOUNTER — HOSPITAL ENCOUNTER (OUTPATIENT)
Dept: CT IMAGING | Facility: HOSPITAL | Age: 50
Discharge: HOME | End: 2025-03-18
Admitting: INTERNAL MEDICINE
Payer: COMMERCIAL

## 2025-03-18 DIAGNOSIS — R09.89 POOR CIRCULATION OF EXTREMITY: Primary | ICD-10-CM

## 2025-03-18 PROCEDURE — 74177 CT ABD & PELVIS W/CONTRAST: CPT

## 2025-03-18 PROCEDURE — 25510000001 IOPAMIDOL PER 1 ML: Performed by: INTERNAL MEDICINE

## 2025-03-18 RX ORDER — IOPAMIDOL 755 MG/ML
150 INJECTION, SOLUTION INTRAVASCULAR
Status: COMPLETED | OUTPATIENT
Start: 2025-03-18 | End: 2025-03-18

## 2025-03-18 RX ADMIN — IOPAMIDOL 125 ML: 755 INJECTION, SOLUTION INTRAVENOUS at 13:55

## 2025-03-20 ENCOUNTER — HOSPITAL ENCOUNTER (OUTPATIENT)
Dept: MRI IMAGING | Facility: HOSPITAL | Age: 50
Discharge: HOME | End: 2025-03-20
Admitting: OTOLARYNGOLOGY
Payer: COMMERCIAL

## 2025-03-20 DIAGNOSIS — R43.2 PARAGEUSIA: ICD-10-CM

## 2025-03-20 PROCEDURE — 25510000002 GADOBENATE DIMEGLUMINE 529 MG/ML SOLUTION: Performed by: OTOLARYNGOLOGY

## 2025-03-20 PROCEDURE — 70553 MRI BRAIN STEM W/O & W/DYE: CPT

## 2025-03-20 PROCEDURE — A9577 INJ MULTIHANCE: HCPCS | Performed by: OTOLARYNGOLOGY

## 2025-03-20 RX ADMIN — GADOBENATE DIMEGLUMINE 15 ML: 529 INJECTION, SOLUTION INTRAVENOUS at 11:11

## 2025-03-22 NOTE — PROGRESS NOTES
"CAT scan of March 21, 2025 reviewed  Reconfirmation of noted terminal ileal stricture which was felt to be \"nonobstructing \".  5 cm length.  Mild wall thickening was noted in this region which would suggest some component of active inflammation..  Superficial aphthous ulceration noted also the terminal ileum    Suspect patient is at risk for obstruction long-term if aggressive management not pursued i.e. initiation of biological therapy.    Will discussed with patient therapeutic options.    Awilda: Please reach out to patient and let her know that the CT enterography did not show any changes other than what we already noted on her colonoscopy which is a stricture at the very end of the small bowel.  I think we should treat this.  If she would like, I think setting up an appointment to go over our options would be best.  "

## 2025-03-24 ENCOUNTER — TELEPHONE (OUTPATIENT)
Dept: GASTROENTEROLOGY | Facility: CLINIC | Age: 50
End: 2025-03-24
Payer: COMMERCIAL

## 2025-03-24 NOTE — TELEPHONE ENCOUNTER
Name: Cydney Pimentel    Relationship: Self    Best Callback Number: [phone]    Patient would like to Schedule a Follow-Up. Unable to schedule within the 3 week timeframe.     Patient is having symptoms of bloating. Please call patient. If not able to reach pt. It is okay to lvm.

## 2025-04-01 ENCOUNTER — OFFICE VISIT (OUTPATIENT)
Dept: GASTROENTEROLOGY | Facility: CLINIC | Age: 50
End: 2025-04-01
Payer: COMMERCIAL

## 2025-04-01 VITALS — OXYGEN SATURATION: 95 % | HEART RATE: 75 BPM | HEIGHT: 61 IN | BODY MASS INDEX: 23.37 KG/M2 | WEIGHT: 123.8 LBS

## 2025-04-01 DIAGNOSIS — K31.84 GASTROPARESIS: ICD-10-CM

## 2025-04-01 DIAGNOSIS — R10.13 DYSPEPSIA: ICD-10-CM

## 2025-04-01 DIAGNOSIS — K50.018 CROHN'S DISEASE OF SMALL INTESTINE WITH OTHER COMPLICATION: Primary | ICD-10-CM

## 2025-04-01 DIAGNOSIS — R43.2 LOSS OF TASTE: ICD-10-CM

## 2025-04-01 DIAGNOSIS — R43.2 AGEUSIA: ICD-10-CM

## 2025-04-01 PROCEDURE — 99214 OFFICE O/P EST MOD 30 MIN: CPT | Performed by: INTERNAL MEDICINE

## 2025-04-01 RX ORDER — GUSELKUMAB 200 MG/20ML
INJECTION INTRAVENOUS
Qty: 600 ML | Refills: 0 | Status: SHIPPED | OUTPATIENT
Start: 2025-04-01

## 2025-04-01 RX ORDER — GUSELKUMAB 200 MG/2ML
200 INJECTION SUBCUTANEOUS TAKE AS DIRECTED
Qty: 20 ML | Refills: 10 | Status: SHIPPED | OUTPATIENT
Start: 2025-04-01

## 2025-04-02 ENCOUNTER — TELEPHONE (OUTPATIENT)
Dept: GASTROENTEROLOGY | Facility: CLINIC | Age: 50
End: 2025-04-02
Payer: COMMERCIAL

## 2025-04-02 ENCOUNTER — HOSPITAL ENCOUNTER (OUTPATIENT)
Dept: NUTRITION | Facility: HOSPITAL | Age: 50
Setting detail: RECURRING SERIES
Discharge: HOME | End: 2025-04-02

## 2025-04-02 ENCOUNTER — HOSPITAL ENCOUNTER (OUTPATIENT)
Dept: CARDIOLOGY | Facility: HOSPITAL | Age: 50
Discharge: HOME | End: 2025-04-02
Admitting: INTERNAL MEDICINE
Payer: COMMERCIAL

## 2025-04-02 DIAGNOSIS — R09.89 POOR CIRCULATION OF EXTREMITY: ICD-10-CM

## 2025-04-02 LAB
BH CV LOWER ARTERIAL LEFT ABI RATIO: 1.1
BH CV LOWER ARTERIAL LEFT DORSALIS PEDIS SYS MAX: 121
BH CV LOWER ARTERIAL LEFT GREAT TOE SYS MAX: 99
BH CV LOWER ARTERIAL LEFT LOW THIGH SYS MAX: 114
BH CV LOWER ARTERIAL LEFT POPLITEAL SYS MAX: 115
BH CV LOWER ARTERIAL LEFT POST TIBIAL SYS MAX: 117
BH CV LOWER ARTERIAL LEFT TBI RATIO: 0.9
BH CV LOWER ARTERIAL RIGHT ABI RATIO: 1.07
BH CV LOWER ARTERIAL RIGHT DORSALIS PEDIS SYS MAX: 118
BH CV LOWER ARTERIAL RIGHT GREAT TOE SYS MAX: 78
BH CV LOWER ARTERIAL RIGHT LOW THIGH SYS MAX: 120
BH CV LOWER ARTERIAL RIGHT POPLITEAL SYS MAX: 114
BH CV LOWER ARTERIAL RIGHT POST TIBIAL SYS MAX: 113
BH CV LOWER ARTERIAL RIGHT TBI RATIO: 0.71
UPPER ARTERIAL LEFT ARM BRACHIAL SYS MAX: 110
UPPER ARTERIAL RIGHT ARM BRACHIAL SYS MAX: 106

## 2025-04-02 PROCEDURE — 93923 UPR/LXTR ART STDY 3+ LVLS: CPT

## 2025-04-02 PROCEDURE — 97802 MEDICAL NUTRITION INDIV IN: CPT

## 2025-04-02 NOTE — CONSULTS
[facility] Nutrition Services          Initial 60 Minute Nutrition Visit    Date: 2025   Patient Name: Cydney Pimentel  : 1975   MRN: [id number]   Referring Provider: Roxana Flores MD    Reason for Visit: Chron's disease with strictures  Visit Format: In person    Nutrition Assessment       Social History:   Social History     Socioeconomic History    Marital status:    Tobacco Use    Smoking status: Never    Smokeless tobacco: Never   Vaping Use    Vaping status: Never Used   Substance and Sexual Activity    Alcohol use: Never    Drug use: Never    Sexual activity: Defer     Active Problem List:   Patient Active Problem List    Diagnosis     Ageusia [R43.2]     Gastroparesis [K31.84]     Gastroesophageal reflux disease without esophagitis [K21.9]     Gastritis without bleeding [K29.70]     Dyspepsia [R10.13]     Ileitis [K52.9]       Current Medications:   Current Outpatient Medications:     amitriptyline (ELAVIL) 25 MG tablet, Take 2 tablets by mouth Every Night. Start with one half tablet and increase by one half tablet nightly as tolerated up to 2 tablets at bedtime, Disp: 60 tablet, Rfl: 11    cetirizine (zyrTEC) 10 MG tablet, Take 1 tablet by mouth Daily., Disp: , Rfl:     Cholecalciferol (Vitamin D) 50 MCG (2000 UT) capsule, , Disp: , Rfl:     ferrous gluconate (FERGON) 324 MG tablet, Take 1 tablet by mouth 2 (Two) Times a Day., Disp: , Rfl:     Guselkumab (Tremfya) 200 MG/20ML solution, Please Infuse 200 mg/ 20 mL at weeks 0, 4, 8, Disp: 600 mL, Rfl: 0    Guselkumab (Tremfya) 200 MG/2ML solution auto-injector, Inject 200 mg under the skin into the appropriate area as directed Take As Directed. Inject at Week 12 then every 4 weeks, Disp: 20 mL, Rfl: 10    pantoprazole (PROTONIX) 40 MG EC tablet, Take 1 tablet by mouth 2 (Two) Times a Day., Disp: 180 tablet, Rfl: 3    Labs: No labs available    Hunger Vital Sign Food Insecurity Assessment:  Within the past 12 months I/we  "worried whether our food would run out before I/we got money to buy more: No   Within the past 12 months the food I/we bought just didn't last and I/we didn't have money to get more: No   Use of food assistance programs (WIC, food stamps, food nunez) No       Food & Nutrition Related History       Food Allergies: None  Food Intolerances: None   Food Behavior: None  Nutrition Impact Symptoms:  bloating and reflux currently  Gastrointestinal conditions that impact intake or food choices: GERD, gastroparesis, Chron's with strictures  Details at home: Lives with family  Who prepares most meals: Pt  Who does grocery shopping: Pt  How many meals are purchased from fast food/sit down restaurants per week: Did not ask at this visit  Difficulty chewin - Normal  Difficulty swallowing: Pt swallows ok but experiences extreme reflux at times  Diet requirement related to personal preference or cultural belief:  None  History of eating disorder/disordered eating habits: None  Language/communication details: English  Barriers to learning: No barriers identified at this time    24 Hour Recall:     We discussed foods that pt does and does not tolerate well, pt has adjusted diet in recent years because of GI diagnoses of reflux, gastroparesis, and recent dx of Chron's.    Additional comments: Pt was present for visit in person. PMH reviewed. Pt was dx'd with reflux and gastroparesis in the past few years and recently was dx'd with Crohn's disease. She currently has narrowing of the ileum/ strictures. Pt has made adjustments to her diet to help alleviate GI symptoms related to GERD and gastroparesis. She has started a new medication which will hopefully improve the stricture and she will follow up with GI in 4 months. Pt is also concerned about her risk for DM because of family hx and reports that she has been dx'd with Pre-DM.    Anthropometrics      Height:   Ht Readings from Last 1 Encounters:   25 154.9 cm (61\") "     Weight:   Wt Readings from Last 3 Encounters:   04/01/25 56.2 kg (123 lb 12.8 oz)   02/11/25 58.1 kg (128 lb)   08/06/24 62.6 kg (138 lb)     BMI: 23.39  Weight Change: Pt reports that she has lost 20# in the past year related to GI conditions.     Physical Activity     Barriers to physical activity: Foot and ankle pain     Physical activity comments: Pt reports that she is not as active as she wants to be. She has been trying to walk some because she knows that will help with GI motility. RD also recommended a foot pedal and swimming for low-impact exercise if foot pain prevents her from higher impact activity.    Estimated Needs     Estimated Energy Needs: 1636-0970 kcal (1.2, +250)    Estimated Protein Needs: 65-75 g (1.2-1.4 g/kg)     Estimated Fluid Needs: At least 8 cups per day     Discussion / Education      To help GI symptoms and to prevent further irritation while pt has the stricture in her ileum, we discussed a low-fiber/low-fat, soft diet. Hopefully, as the stricture improves, pt can add in other foods as tolerated.    RD recommended eating 5-6 small meals per day and we discussed soft/low-fiber, low-fat food options from each food group. We discussed ideas including soft meats and eggs, yogurt, protein powder, cottage cheese, canned/cooked fruits, and soft/cooked vegetables. Pt reports that she does not like canned fruits and vegetables, but would be willing to cook her own fruits such as apples and pears. We discussed limiting/avoiding foods with nuts and seeds which could further irritate the GI tract.    We also discussed getting at leave 8 cups of fluids each day and sipping throughout the day to prevent feelings of fullness.     Because pt is concerned about blood sugar management, we discussed always including a source of protein with each small meal.    Assessment of patient engagement: Asked appropriate questions    Measurement of understanding: Patient verbalized  understanding    Resources Provided:     Low-fiber Nutrition Therapy  Soft Diet (for Chron's Disease with strictures)    Goal (s)      Goal 1: Eat 5-6 small meals each day    Goal 2: Limit fiber to approx 8 grams per day and primarily eat soft, easy to digest foods for now until stricture improves    Goal 3: Take note of any foods that are not well tolerated to discuss at the next visit    Plan of Care     PES Statement:   Altered GI function related to Crohn's disease and small bowel stricture as evidence by doctor's notes, dietary recall, and interview.     Follow Up Visit      Follow Up:   5/21/25 @ 11:15 AM    Total of 60 minutes spent with patient on nutrition counseling. Education based on Academy of Nutrition and Dietetics guidelines. Patient was provided with RD's contact information. Thank you for this referral.

## 2025-04-02 NOTE — TELEPHONE ENCOUNTER
Caller: Cydney Pimentel    Relationship: Self    Best call back number: [phone]    What medication are you requesting: Guselkumab (Tremfya) 200 MG/20ML solution           Additional notes: PT STATES DRUG COMPANY IS REQUESTING A CORRECTED PT ENROLLMENT FORM. W/ IV OR SUB Q. PT NEEDS DOSE BY FRIDAY IF POSSIBLE PLEASE CONTACT PT W/ANY QUESTIONS OR CONCERNS.

## 2025-04-04 ENCOUNTER — PRIOR AUTHORIZATION (OUTPATIENT)
Dept: GASTROENTEROLOGY | Facility: CLINIC | Age: 50
End: 2025-04-04
Payer: COMMERCIAL

## 2025-04-05 NOTE — PROGRESS NOTES
GASTROENTEROLOGY OFFICE NOTE  Cydney Pimentel  [id number]  1975      Chief Complaint   Patient presents with    Stricture end of small Bowel consult        HISTORY OF PRESENT ILLNESS:  49-year-old female presents for follow-up after being found on small bowel follow-through and colonoscopy to have a terminal ileum stricture with active Crohn's disease which is mild.  Just some aphthous ulcerations noted but at risk for stricturing and obstruction.  She is agreeable to initiating therapy and has nothing in the way of any localizing GI complaints specifically without any right lower quadrant abdominal pain, cramping or diarrhea.  She does not have any generalized malaise or fatigue.    She is under some stress and is currently undergoing divorce.    PAST MEDICAL HISTORY  Past Medical History:    Anemia    GERD (gastroesophageal reflux disease)        PAST SURGICAL HISTORY  Past Surgical History:    COLONOSCOPY    UPPER GASTROINTESTINAL ENDOSCOPY        MEDICATIONS:    Current Outpatient Medications:     amitriptyline (ELAVIL) 25 MG tablet, Take 2 tablets by mouth Every Night. Start with one half tablet and increase by one half tablet nightly as tolerated up to 2 tablets at bedtime, Disp: 60 tablet, Rfl: 11    cetirizine (zyrTEC) 10 MG tablet, Take 1 tablet by mouth Daily., Disp: , Rfl:     Cholecalciferol (Vitamin D) 50 MCG (2000 UT) capsule, , Disp: , Rfl:     ferrous gluconate (FERGON) 324 MG tablet, Take 1 tablet by mouth 2 (Two) Times a Day., Disp: , Rfl:     Guselkumab (Tremfya) 200 MG/20ML solution, Please Infuse 200 mg/ 20 mL at weeks 0, 4, 8, Disp: 600 mL, Rfl: 0    Guselkumab (Tremfya) 200 MG/2ML solution auto-injector, Inject 200 mg under the skin into the appropriate area as directed Take As Directed. Inject at Week 12 then every 4 weeks, Disp: 20 mL, Rfl: 10    pantoprazole (PROTONIX) 40 MG EC tablet, Take 1 tablet by mouth 2 (Two) Times a Day., Disp: 180 tablet, Rfl: 3    ALLERGIES  has no known  "allergies.    FAMILY HISTORY:  Cancer-related family history includes Ovarian cancer in her maternal aunt. There is no history of Breast cancer.  Colon Cancer-related family history is not on file.    SOCIAL HISTORY  She  reports that she has never smoked. She has never used smokeless tobacco. She reports that she does not drink alcohol and does not use drugs.    but in the process of a divorce.  2 sons.  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  Alert and oriented.  HEENT: Anicteric sclera  Neck: Supple  Lungs: Grossly normal respiration without labored breathing or audible wheezing noted.  Speaking in full sentences  Abdomen: Without gross or obvious distention  Neurologic: Normal cognition and affect.  Alert and oriented      ASSESSMENT/PLAN  1.-Fiber stenosing Crohn's disease  She understands that untreated this can progress to stricturing to the level of obstruction and perforation and, despite the absence of symptoms therapy is recommended.  She is agreeable to starting Tremfya.  She will review the risks, benefits, options in further detail on the Tremfya website and let us know if she has any questions.  2.-Dyspepsia and GERD  Currently managed with proton pump inhibitor therapy  3.-Gastroparesis  Managed via dietary modifications  4.-Iron deficiency anemia    Jacky Navarro MD  4/5/2025   14:35 EDT        "

## 2025-05-05 ENCOUNTER — TRANSCRIBE ORDERS (OUTPATIENT)
Dept: NUTRITION | Facility: HOSPITAL | Age: 50
End: 2025-05-05
Payer: COMMERCIAL

## 2025-05-05 DIAGNOSIS — D64.9 ANEMIA, UNSPECIFIED TYPE: Primary | ICD-10-CM

## 2025-05-21 ENCOUNTER — HOSPITAL ENCOUNTER (OUTPATIENT)
Dept: NUTRITION | Facility: HOSPITAL | Age: 50
Discharge: HOME | End: 2025-05-21

## 2025-05-21 NOTE — CONSULTS
"[facility] Nutrition Services   Free 30 Minute Nutrition Follow Up     Date: 2025   Patient Name: Cydney Pimentel  : 1975   MRN: [id number]   Referring Provider: Roxana Flores MD    Reason for Visit: Chron's disease with strictures  Visit Format: In person  Last Appointment Date: 25    Nutrition Assessment       Updated Labs: None  Food Insecurity: None  Food Behavior: None  Nutrition Impact Symptoms: bloating, reflux improved  Difficulty chewin - Normal  Difficulty swallowing: Pt swallows ok, but has had extreme reflux which has improved    Anthropometrics      Height:   Ht Readings from Last 1 Encounters:   25 154.9 cm (61\")     Weight:   Wt Readings from Last 3 Encounters:   25 56.2 kg (123 lb 12.8 oz)   25 58.1 kg (128 lb)   24 62.6 kg (138 lb)     Wt 25 in office: 127#  BMI: 24  Weight Change: Pt has regained a few pounds since the last visit.    Discussion / Education      Pt was present in person for visit. Pt has started Tremfya and has had two doses. Pt reports that her ability to taste has improved and reflux has improved overall.    Pt reports that she has been following a soft, low-fiber diet as discussed at the last visit. She has has been eating small meals and taking note of foods that are not well-tolerated. She reports that she has only noticed one meal that has worsened her bloating. The food was a homemade dish that her mom made containing cabbage and sesame seeds and she suspects one or both of these foods caused the discomfort. She is avoiding this dish for now.    Pt will follow-up with GI in July. RD recommends continuing current diet and goals for now since symptoms, wt, and taste have improved. We will follow-up after her GI appt and discussed any adjustments. The goal is to be able to liberalization diet/ gradually increase fiber intake as tolerated if stricture and inflammation are improved. RD will  continue to " follow.    Assessment of patient engagement: Participated in activity     Measurement of understanding: Patient verbalized understanding    Resources Provided:  Reviewed resources from previous visit    Goal (s)      Goal Comment % Met   Goal 1: Eat 5-6 small meals each day    Pt is doing well with this and will continue with this goal. 75%        Goal 2: Limit fiber to approx 8 grams per day and primarily eat soft, easy to digest foods for now until stricture improves Pt is doing well with this and will continue with this goal. 100%        Goal 3: Take note of any foods that are not well tolerated to discuss at the next visit Pt is taking note of foods that cause increased discomfort and will continue with this goal. 100%     Plan of Care     PES Statement:   Altered GI function related to Crohn's disease and small bowel stricture as evidence by doctor's notes, dietary recall, and interview.      Status: Ongoing    Follow Up Visit      Follow Up not scheduled at this time , pt will call to schedule next visit after she follows up with GI and has results.    Total of 30 minutes spent with patient on nutrition counseling. Education based on Academy of Nutrition and Dietetics guidelines. Patient was provided with RD's contact information. Thank you for this referral.

## 2025-07-01 ENCOUNTER — OFFICE VISIT (OUTPATIENT)
Dept: GASTROENTEROLOGY | Facility: CLINIC | Age: 50
End: 2025-07-01
Payer: COMMERCIAL

## 2025-07-01 VITALS
OXYGEN SATURATION: 99 % | HEIGHT: 61 IN | WEIGHT: 123 LBS | HEART RATE: 63 BPM | SYSTOLIC BLOOD PRESSURE: 114 MMHG | DIASTOLIC BLOOD PRESSURE: 67 MMHG | BODY MASS INDEX: 23.22 KG/M2

## 2025-07-01 DIAGNOSIS — K50.018 CROHN'S DISEASE OF SMALL INTESTINE WITH OTHER COMPLICATION: Primary | ICD-10-CM

## 2025-07-01 DIAGNOSIS — R43.2 LOSS OF TASTE: ICD-10-CM

## 2025-07-01 DIAGNOSIS — K52.9 ILEITIS: ICD-10-CM

## 2025-07-01 DIAGNOSIS — R43.2 AGEUSIA: ICD-10-CM

## 2025-07-01 DIAGNOSIS — K31.84 GASTROPARESIS: ICD-10-CM

## 2025-07-01 PROCEDURE — 99214 OFFICE O/P EST MOD 30 MIN: CPT | Performed by: INTERNAL MEDICINE

## 2025-07-03 NOTE — PROGRESS NOTES
GASTROENTEROLOGY OFFICE NOTE  Cydney Pimentel  [id number]  1975      Chief Complaint   Patient presents with    Crohn's Disease of small intestine with other complications     Tremfya started 3 months ago        HISTORY OF PRESENT ILLNESS:  History of Present Illness  The patient presents for a follow-up of small bowel fiber stenosing Crohn's disease of the terminal ileum, having completed the fourth dose of Tremfya.    She has been receiving Tremfya treatment, with her first dose administered on 04/04/2025. She reports an improvement in her condition, with no significant symptoms currently present. She experienced a loss of taste and smell for the past 5 to 6 months, but these senses have recently returned to normal within a couple of weeks. Additionally, she had sensitive gums, which have also improved. She is now able to consume larger meals without discomfort, although she remains cautious about her food intake.    She continues to take pantoprazole for her GERD, which she reports is under control.    PAST MEDICAL HISTORY  Past Medical History:    Anemia    Crohn's disease    GERD (gastroesophageal reflux disease)        PAST SURGICAL HISTORY  Past Surgical History:    COLONOSCOPY    UPPER GASTROINTESTINAL ENDOSCOPY        MEDICATIONS:    Current Outpatient Medications:     cetirizine (zyrTEC) 10 MG tablet, Take 1 tablet by mouth Daily., Disp: , Rfl:     Cholecalciferol (Vitamin D) 50 MCG (2000 UT) capsule, , Disp: , Rfl:     ferrous gluconate (FERGON) 324 MG tablet, Take 1 tablet by mouth 2 (Two) Times a Day., Disp: , Rfl:     Guselkumab (Tremfya) 200 MG/2ML solution auto-injector, Inject 200 mg under the skin into the appropriate area as directed Take As Directed. Inject at Week 12 then every 4 weeks, Disp: 20 mL, Rfl: 10    pantoprazole (PROTONIX) 40 MG EC tablet, Take 1 tablet by mouth 2 (Two) Times a Day., Disp: 180 tablet, Rfl: 3    ALLERGIES  has no known allergies.    FAMILY HISTORY:  Cancer-related  "family history includes Ovarian cancer in her maternal aunt. There is no history of Breast cancer.  Colon Cancer-related family history is not on file.    SOCIAL HISTORY  She  reports that she has never smoked. She has never used smokeless tobacco. She reports that she does not drink alcohol and does not use drugs.   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  Non-smoker      PHYSICAL EXAM   /67 (BP Location: Right arm, Patient Position: Sitting, Cuff Size: Large Adult)   Pulse 63   Ht 154.9 cm (61\")   Wt 55.8 kg (123 lb)   SpO2 99%   BMI 23.24 kg/m²   General: Pleasant, no apparent acute distress.  Alert and oriented.  HEENT: Anicteric sclera  Lungs: Grossly normal respiration without labored breathing or audible wheezing noted.  Speaking in full sentences  Abdomen: Without gross or obvious distention  Neurologic: Normal cognition and affect.  Alert and oriented      ASSESSMENT/PLAN  Assessment & Plan  1. Small bowel Crohn's disease:  - Completed fourth dose of Tremfya; first dose administered on 04/04/2025.  - Reports improvement in symptoms, including normalization of taste and smell, and reduced gum sensitivity.  - Able to eat more without discomfort.  - Goal: Achieve endoscopic remission.  - Colonoscopy scheduled for 10/2025 to assess treatment effectiveness and check for active disease or strictures.  - Consider dilation if inflammation is causing narrowing to allow scope passage.    2. Gastroesophageal reflux disease (GERD):  - Reports stomach feels better.  - Currently taking pantoprazole.    3.  Follow-up  - Follow-up: 01/2026.    4.  Recent loss of taste and smell  - Now resolved following initiation of Tremfya    5.  Gastroparesis  - Not currently problematic      Jacky Navarro MD  7/3/2025   07:18 EDT      Patient or patient representative verbalized consent for the use of Ambient Listening during the visit with  Jacky Navarro MD for chart documentation. " 7/3/2025  07:19 EDT